# Patient Record
Sex: MALE | Race: OTHER | Employment: FULL TIME | URBAN - METROPOLITAN AREA
[De-identification: names, ages, dates, MRNs, and addresses within clinical notes are randomized per-mention and may not be internally consistent; named-entity substitution may affect disease eponyms.]

---

## 2024-03-10 ENCOUNTER — APPOINTMENT (OUTPATIENT)
Dept: ULTRASOUND IMAGING | Age: 29
End: 2024-03-10
Attending: PHYSICIAN ASSISTANT
Payer: COMMERCIAL

## 2024-03-10 ENCOUNTER — HOSPITAL ENCOUNTER (EMERGENCY)
Age: 29
Discharge: HOME | End: 2024-03-10
Attending: EMERGENCY MEDICINE
Payer: COMMERCIAL

## 2024-03-10 ENCOUNTER — APPOINTMENT (OUTPATIENT)
Dept: CT IMAGING | Age: 29
End: 2024-03-10
Attending: EMERGENCY MEDICINE
Payer: COMMERCIAL

## 2024-03-10 VITALS
DIASTOLIC BLOOD PRESSURE: 94 MMHG | TEMPERATURE: 100 F | BODY MASS INDEX: 24.8 KG/M2 | SYSTOLIC BLOOD PRESSURE: 137 MMHG | WEIGHT: 158 LBS | HEIGHT: 67 IN | HEART RATE: 106 BPM | RESPIRATION RATE: 16 BRPM | OXYGEN SATURATION: 100 %

## 2024-03-10 DIAGNOSIS — R10.9 ABDOMINAL PAIN, ACUTE: ICD-10-CM

## 2024-03-10 DIAGNOSIS — N50.811 PAIN IN RIGHT TESTICLE: ICD-10-CM

## 2024-03-10 DIAGNOSIS — N45.2 ORCHITIS: Primary | ICD-10-CM

## 2024-03-10 DIAGNOSIS — R50.9 FEVER, UNSPECIFIED FEVER CAUSE: ICD-10-CM

## 2024-03-10 DIAGNOSIS — N45.1 EPIDIDYMITIS: ICD-10-CM

## 2024-03-10 LAB
ALBUMIN SERPL-MCNC: 3.6 G/DL (ref 3.4–5)
ALBUMIN/GLOB SERPL: 0.8 {RATIO} (ref 1–2)
ALP LIVER SERPL-CCNC: 75 U/L
ALT SERPL-CCNC: 42 U/L
ANION GAP SERPL CALC-SCNC: 7 MMOL/L (ref 0–18)
AST SERPL-CCNC: 12 U/L (ref 15–37)
BASOPHILS # BLD AUTO: 0.04 X10(3) UL (ref 0–0.2)
BASOPHILS NFR BLD AUTO: 0.4 %
BILIRUB SERPL-MCNC: 0.9 MG/DL (ref 0.1–2)
BUN BLD-MCNC: 11 MG/DL (ref 9–23)
CALCIUM BLD-MCNC: 9.1 MG/DL (ref 8.5–10.1)
CHLORIDE SERPL-SCNC: 101 MMOL/L (ref 98–112)
CLARITY UR REFRACT.AUTO: CLEAR
CO2 SERPL-SCNC: 25 MMOL/L (ref 21–32)
COLOR UR AUTO: YELLOW
CREAT BLD-MCNC: 0.87 MG/DL
EGFRCR SERPLBLD CKD-EPI 2021: 121 ML/MIN/1.73M2 (ref 60–?)
EOSINOPHIL # BLD AUTO: 0.01 X10(3) UL (ref 0–0.7)
EOSINOPHIL NFR BLD AUTO: 0.1 %
ERYTHROCYTE [DISTWIDTH] IN BLOOD BY AUTOMATED COUNT: 12.3 %
GLOBULIN PLAS-MCNC: 4.8 G/DL (ref 2.8–4.4)
GLUCOSE BLD-MCNC: 107 MG/DL (ref 70–99)
GLUCOSE UR STRIP.AUTO-MCNC: NEGATIVE MG/DL
HCT VFR BLD AUTO: 34.2 %
HGB BLD-MCNC: 12.1 G/DL
IMM GRANULOCYTES # BLD AUTO: 0.03 X10(3) UL (ref 0–1)
IMM GRANULOCYTES NFR BLD: 0.3 %
KETONES UR STRIP.AUTO-MCNC: >=160 MG/DL
LEUKOCYTE ESTERASE UR QL STRIP.AUTO: NEGATIVE
LIPASE SERPL-CCNC: 24 U/L (ref 13–75)
LYMPHOCYTES # BLD AUTO: 0.97 X10(3) UL (ref 1–4)
LYMPHOCYTES NFR BLD AUTO: 10.7 %
MCH RBC QN AUTO: 30.6 PG (ref 26–34)
MCHC RBC AUTO-ENTMCNC: 35.4 G/DL (ref 31–37)
MCV RBC AUTO: 86.6 FL
MONOCYTES # BLD AUTO: 1.26 X10(3) UL (ref 0.1–1)
MONOCYTES NFR BLD AUTO: 13.9 %
NEUTROPHILS # BLD AUTO: 6.74 X10 (3) UL (ref 1.5–7.7)
NEUTROPHILS # BLD AUTO: 6.74 X10(3) UL (ref 1.5–7.7)
NEUTROPHILS NFR BLD AUTO: 74.6 %
NITRITE UR QL STRIP.AUTO: NEGATIVE
OSMOLALITY SERPL CALC.SUM OF ELEC: 276 MOSM/KG (ref 275–295)
PH UR STRIP.AUTO: 5.5 [PH] (ref 5–8)
PLATELET # BLD AUTO: 157 10(3)UL (ref 150–450)
POCT INFLUENZA A: NEGATIVE
POCT INFLUENZA B: NEGATIVE
POTASSIUM SERPL-SCNC: 3.8 MMOL/L (ref 3.5–5.1)
PROT SERPL-MCNC: 8.4 G/DL (ref 6.4–8.2)
RBC # BLD AUTO: 3.95 X10(6)UL
RBC #/AREA URNS AUTO: >10 /HPF
RBC #/AREA URNS AUTO: >10 /HPF
SARS-COV-2 RNA RESP QL NAA+PROBE: NOT DETECTED
SODIUM SERPL-SCNC: 133 MMOL/L (ref 136–145)
SP GR UR STRIP.AUTO: >=1.03 (ref 1–1.03)
UROBILINOGEN UR STRIP.AUTO-MCNC: 0.2 MG/DL
WBC # BLD AUTO: 9.1 X10(3) UL (ref 4–11)

## 2024-03-10 PROCEDURE — 85025 COMPLETE CBC W/AUTO DIFF WBC: CPT | Performed by: EMERGENCY MEDICINE

## 2024-03-10 PROCEDURE — 81001 URINALYSIS AUTO W/SCOPE: CPT | Performed by: EMERGENCY MEDICINE

## 2024-03-10 PROCEDURE — 87591 N.GONORRHOEAE DNA AMP PROB: CPT | Performed by: PHYSICIAN ASSISTANT

## 2024-03-10 PROCEDURE — 76870 US EXAM SCROTUM: CPT | Performed by: PHYSICIAN ASSISTANT

## 2024-03-10 PROCEDURE — 99284 EMERGENCY DEPT VISIT MOD MDM: CPT

## 2024-03-10 PROCEDURE — 74177 CT ABD & PELVIS W/CONTRAST: CPT | Performed by: EMERGENCY MEDICINE

## 2024-03-10 PROCEDURE — 87491 CHLMYD TRACH DNA AMP PROBE: CPT | Performed by: PHYSICIAN ASSISTANT

## 2024-03-10 PROCEDURE — 93975 VASCULAR STUDY: CPT | Performed by: PHYSICIAN ASSISTANT

## 2024-03-10 PROCEDURE — 96374 THER/PROPH/DIAG INJ IV PUSH: CPT

## 2024-03-10 PROCEDURE — 80053 COMPREHEN METABOLIC PANEL: CPT | Performed by: EMERGENCY MEDICINE

## 2024-03-10 PROCEDURE — 85025 COMPLETE CBC W/AUTO DIFF WBC: CPT

## 2024-03-10 PROCEDURE — 83690 ASSAY OF LIPASE: CPT | Performed by: EMERGENCY MEDICINE

## 2024-03-10 PROCEDURE — 87502 INFLUENZA DNA AMP PROBE: CPT | Performed by: PHYSICIAN ASSISTANT

## 2024-03-10 PROCEDURE — 96372 THER/PROPH/DIAG INJ SC/IM: CPT

## 2024-03-10 PROCEDURE — 81015 MICROSCOPIC EXAM OF URINE: CPT | Performed by: EMERGENCY MEDICINE

## 2024-03-10 RX ORDER — ACETAMINOPHEN 500 MG
1000 TABLET ORAL ONCE
Status: COMPLETED | OUTPATIENT
Start: 2024-03-10 | End: 2024-03-10

## 2024-03-10 RX ORDER — DOXYCYCLINE HYCLATE 100 MG/1
100 CAPSULE ORAL 2 TIMES DAILY
Qty: 20 CAPSULE | Refills: 0 | Status: SHIPPED | OUTPATIENT
Start: 2024-03-10 | End: 2024-03-20

## 2024-03-10 RX ORDER — CEFTRIAXONE 500 MG/1
500 INJECTION, POWDER, FOR SOLUTION INTRAMUSCULAR; INTRAVENOUS ONCE
Status: COMPLETED | OUTPATIENT
Start: 2024-03-10 | End: 2024-03-10

## 2024-03-10 RX ORDER — IBUPROFEN 600 MG/1
600 TABLET ORAL ONCE
Status: COMPLETED | OUTPATIENT
Start: 2024-03-10 | End: 2024-03-10

## 2024-03-10 RX ORDER — DIPHENHYDRAMINE HYDROCHLORIDE 50 MG/ML
50 INJECTION INTRAMUSCULAR; INTRAVENOUS ONCE
Status: COMPLETED | OUTPATIENT
Start: 2024-03-10 | End: 2024-03-10

## 2024-03-10 NOTE — ED INITIAL ASSESSMENT (HPI)
Pt started having lower r abd pain radiates to r testicle was at immed care and had fever. Blood in urine

## 2024-03-10 NOTE — ED PROVIDER NOTES
Chief Complaint   Patient presents with    Abdomen/Flank Pain       HPI:     Jeff Lucero is a 28 year old male with a history of IBS presents to the emergency department for evaluation of abdominal pain and right testicular pain has been going on since Thursday.  Patient states the pain is mainly in his right lower abdomen but can be diffuse in his entire abdomen.  States he has had a fever since Thursday as well and has been taking Tylenol for that.  Denies any cough or cold symptoms.  Has nausea but denies any vomiting.  Has had intermittent diarrhea.  Denies any pain in his back or blood in his urine.  No dysuria.  Had some clearish minimal penile discharge he noticed yesterday before urinating but denies any other penile discharge or irritation.  Patient had unprotected sex about 1 month ago.  States he went to urgent care prior to arrival and they checked his urine and was told he had blood in his urine.      PFSH    PFS asessment screens reviewed  Nurses notes reviewed    No family history on file.    Social History     Socioeconomic History    Marital status: Single     Spouse name: Not on file    Number of children: Not on file    Years of education: Not on file    Highest education level: Not on file   Occupational History    Not on file   Tobacco Use    Smoking status: Every Day    Smokeless tobacco: Not on file    Tobacco comments:     uses pot every day   Substance and Sexual Activity    Alcohol use: Yes     Comment: social    Drug use: Not on file    Sexual activity: Not on file   Other Topics Concern    Not on file   Social History Narrative    Not on file     Social Determinants of Health     Financial Resource Strain: Not on file   Food Insecurity: Not on file   Transportation Needs: Not on file   Physical Activity: Not on file   Stress: Not on file   Social Connections: Not on file   Housing Stability: Not on file         ROS:   Positive for stated complaint  All other systems reviewed and  negative except as noted above.  Constitutional and Vital Signs Reviewed.      Physical Exam:     Findings:    /87   Pulse 100   Temp 99.2 °F (37.3 °C) (Temporal)   Resp 16   Ht 170.2 cm (5' 7\")   Wt 71.7 kg   SpO2 99%   BMI 24.75 kg/m²   GENERAL: alert, normal appearance, no distress.                 HEAD: Normocephalic, atraumatic.                    EARS: External ears normal.                  NOSE: Normal external appearance.                   MOUTH: Oropharynx patent and moist.                  EYES: Conjunctiva without injection, EOMs intact.             NECK: supple.             CARDIO: Regular rate and rhythm              LUNGS: No respiratory distress, nonlabored respirations.             GI: Abdomen soft and nonrigid.  Generalized abdominal tenderness to palpation with most significant pain over McBurney's point.  No guarding.  No rebound tenderness.  :  exam after patient's verbal consent with LATASHA Campos, as chaperone.  Right testicular mild tenderness to palpation.  No penile discharge.  No inguinal lymphadenopathy.  MSK: Normal rom.     NEURO: Alert and oriented.       MDM/Assessment/Plan:   Orders for this encounter:    Orders Placed This Encounter    Urinalysis with Culture Reflex     Order Specific Question:   Specimen source:     Answer:   Urine, clean catch     Order Specific Question:   Clinical justification:     Answer:   Hematuria     Order Specific Question:   Documentation of symptoms of UTI or sepsis:     Answer:   Documentation of symptoms of UTI or sepsis must be corroborated within the EMR     Order Specific Question:   Release to patient     Answer:   Immediate    CBC With Differential With Platelet    Comp Metabolic Panel (14)    Lipase     Order Specific Question:   Release to patient     Answer:   Immediate    UA Microscopic only, urine     Order Specific Question:   Release to patient     Answer:   Immediate    CT ABDOMEN+PELVIS(CONTRAST ONLY)(CPT=74177)     Order  Specific Question:   If clinically indicated, CT Protocol includes Oral Contrast. Can Oral Contrast be administered?     Answer:   Yes     Order Specific Question:   What is the Relevant Clinical Indication / Reason for Exam?     Answer:   fever and RLQ pain x 3 days     Order Specific Question:   Release to patient     Answer:   Immediate    US SCROTUM W/ DOPPLER (CPT=93975/49421)     Order Specific Question:   What is the Relevant Clinical Indication / Reason for Exam?     Answer:   Right testicular pain    Chlamydia/Gc Amplification     Order Specific Question:   Specify specimen source     Answer:   urine    Rapid SARS-CoV-2 by PCR     Order Specific Question:   Release to patient     Answer:   Immediate    POCT Flu Test     Order Specific Question:   Release to patient     Answer:   Immediate    Insert Peripheral IV    iopamidol 76% (ISOVUE-[id number]) injection for power injector    diphenhydrAMINE (Benadryl) 50 mg/mL  injection 50 mg    CBC W/ DIFFERENTIAL       Labs performed this visit:  Recent Results (from the past 10 hour(s))   Urinalysis with Culture Reflex    Collection Time: 03/10/24  1:46 PM    Specimen: Urine, clean catch   Result Value Ref Range    Urine Color Yellow Yellow    Clarity Urine Clear Clear    Spec Gravity >=1.030 1.005 - 1.030    Glucose Urine Negative Negative mg/dL    Bilirubin Urine Moderate (A) Negative    Ketones Urine >=160 (A) Negative mg/dL    Blood Urine Large (A) Negative    pH Urine 5.5 5.0 - 8.0    Protein Urine 100 mg/dL (A) Negative mg/dL    Urobilinogen Urine 0.2 <2.0 mg/dL    Nitrite Urine Negative Negative    Leukocyte Esterase Urine Negative Negative    WBC Urine 1-5 0 - 5 /HPF    RBC Urine >10 (A) 0 - 2 /HPF    Bacteria Urine None Seen None Seen /HPF    Squamous Epi. Cells None Seen None Seen /HPF    Renal Tubular Epithelial Cells None Seen None Seen /HPF    Transitional Cells None Seen None Seen /HPF    Yeast Urine None Seen None Seen /HPF   Comp Metabolic Panel (14)     Collection Time: 03/10/24  1:46 PM   Result Value Ref Range    Glucose 107 (H) 70 - 99 mg/dL    Sodium 133 (L) 136 - 145 mmol/L    Potassium 3.8 3.5 - 5.1 mmol/L    Chloride 101 98 - 112 mmol/L    CO2 25.0 21.0 - 32.0 mmol/L    Anion Gap 7 0 - 18 mmol/L    BUN 11 9 - 23 mg/dL    Creatinine 0.87 0.70 - 1.30 mg/dL    Calcium, Total 9.1 8.5 - 10.1 mg/dL    Calculated Osmolality 276 275 - 295 mOsm/kg    eGFR-Cr 121 >=60 mL/min/1.73m2    AST 12 (L) 15 - 37 U/L    ALT 42 16 - 61 U/L    Alkaline Phosphatase 75 45 - 117 U/L    Bilirubin, Total 0.9 0.1 - 2.0 mg/dL    Total Protein 8.4 (H) 6.4 - 8.2 g/dL    Albumin 3.6 3.4 - 5.0 g/dL    Globulin  4.8 (H) 2.8 - 4.4 g/dL    A/G Ratio 0.8 (L) 1.0 - 2.0   CBC W/ DIFFERENTIAL    Collection Time: 03/10/24  1:46 PM   Result Value Ref Range    WBC 9.1 4.0 - 11.0 x10(3) uL    RBC 3.95 (L) 4.30 - 5.70 x10(6)uL    HGB 12.1 (L) 13.0 - 17.5 g/dL    HCT 34.2 (L) 39.0 - 53.0 %    .0 150.0 - 450.0 10(3)uL    MCV 86.6 80.0 - 100.0 fL    MCH 30.6 26.0 - 34.0 pg    MCHC 35.4 31.0 - 37.0 g/dL    RDW 12.3 %    Neutrophil Absolute Prelim 6.74 1.50 - 7.70 x10 (3) uL    Neutrophil Absolute 6.74 1.50 - 7.70 x10(3) uL    Lymphocyte Absolute 0.97 (L) 1.00 - 4.00 x10(3) uL    Monocyte Absolute 1.26 (H) 0.10 - 1.00 x10(3) uL    Eosinophil Absolute 0.01 0.00 - 0.70 x10(3) uL    Basophil Absolute 0.04 0.00 - 0.20 x10(3) uL    Immature Granulocyte Absolute 0.03 0.00 - 1.00 x10(3) uL    Neutrophil % 74.6 %    Lymphocyte % 10.7 %    Monocyte % 13.9 %    Eosinophil % 0.1 %    Basophil % 0.4 %    Immature Granulocyte % 0.3 %   Lipase    Collection Time: 03/10/24  1:46 PM   Result Value Ref Range    Lipase 24 13 - 75 U/L   UA Microscopic only, urine    Collection Time: 03/10/24  1:46 PM   Result Value Ref Range    WBC Urine 1-5 0 - 5 /HPF    RBC Urine >10 (A) 0 - 2 /HPF    Bacteria Urine None Seen None Seen /HPF    Squamous Epi. Cells None Seen None Seen /HPF    Renal Tubular Epithelial Cells None Seen  None Seen /HPF    Transitional Cells None Seen None Seen /HPF    Yeast Urine None Seen None Seen /HPF   Rapid SARS-CoV-2 by PCR    Collection Time: 03/10/24  2:49 PM    Specimen: Nares; Other   Result Value Ref Range    Rapid SARS-CoV-2 by PCR Not Detected Not Detected   POCT Flu Test    Collection Time: 03/10/24  2:50 PM    Specimen: Nares; Other   Result Value Ref Range    POCT INFLUENZA A Negative Negative    POCT INFLUENZA B Negative Negative       After CT scan with contrast, patient began getting itchy hives.  Evaluated immediately, noted urticarial lesions on patient's face and torso.  Patient denies any breathing problems, or any swelling or itchiness of tongue or throat.  No vomiting.  Will treat with Benadryl 50 mg IV at this time.  Will monitor closely.    MDM:  Patient seen and evaluated with attending Dr. Gandara.  Pending final workup at the end of my shift.  Dr. Gandara will determine patient's final treatment plan and disposition.    Diagnosis:    ICD-10-CM    1. Abdominal pain, acute  R10.9       2. Pain in right testicle  N50.811       3. Fever, unspecified fever cause  R50.9           All results reviewed and discussed with patient.  See AVS for detailed discharge instructions for your condition today.    Follow Up with:  No follow-up provider specified.

## 2024-03-10 NOTE — ED QUICK NOTES
Patient \"feels better, the itching is gone\", hives markedly decreased.  OK to go to US per Dr Gandara

## 2024-03-10 NOTE — ED QUICK NOTES
Back from CT/cart, per tech \"the patient has hives that started after the infusion of dye\".  Pt has hives to face, itching, denies trouble breathing/swallowing.  Yogesh SMITH at bedside

## 2024-03-10 NOTE — DISCHARGE INSTRUCTIONS
Take the antibiotic as prescribed.  Follow-up with your primary care doctor on Thursday as planned for reassessment.  Please ask them to recheck your urine to ensure clearance of the blood that was present today.  Return for any fevers, worsening pain or any other concerning symptoms.

## 2024-03-11 LAB
C TRACH DNA SPEC QL NAA+PROBE: NEGATIVE
N GONORRHOEA DNA SPEC QL NAA+PROBE: NEGATIVE

## (undated) NOTE — LETTER
Date & Time: 3/10/2024, 4:51 PM  Patient: Jeff Lucero  Encounter Provider(s):    Damián Gandara MD McPherson, Robert, PA       To Whom It May Concern:    Jeff Lucero was seen and treated in our department on 3/10/2024. He can return to work on Wednesday, March 13, 2024.    If you have any questions or concerns, please do not hesitate to call.        _____________________________  Physician/APC Signature